# Patient Record
Sex: FEMALE | Race: WHITE | URBAN - METROPOLITAN AREA
[De-identification: names, ages, dates, MRNs, and addresses within clinical notes are randomized per-mention and may not be internally consistent; named-entity substitution may affect disease eponyms.]

---

## 2019-07-08 ENCOUNTER — NEW PATIENT (OUTPATIENT)
Dept: URBAN - METROPOLITAN AREA CLINIC 51 | Facility: CLINIC | Age: 74
End: 2019-07-08
Payer: MEDICARE

## 2019-07-08 DIAGNOSIS — H25.813 COMBINED FORMS OF AGE-RELATED CATARACT, BILATERAL: ICD-10-CM

## 2019-07-08 DIAGNOSIS — H04.123 TEAR FILM INSUFFICIENCY OF BILATERAL LACRIMAL GLANDS: ICD-10-CM

## 2019-07-08 DIAGNOSIS — H35.3122 NONEXUDATIVE MACULAR DEGENERATION, INTERMEDIATE DRY STAGE, LEFT EYE: Primary | ICD-10-CM

## 2019-07-08 PROCEDURE — 92134 CPTRZ OPH DX IMG PST SGM RTA: CPT | Performed by: OPTOMETRIST

## 2019-07-08 PROCEDURE — 92004 COMPRE OPH EXAM NEW PT 1/>: CPT | Performed by: OPTOMETRIST

## 2019-07-08 ASSESSMENT — INTRAOCULAR PRESSURE
OS: 18
OD: 19

## 2019-07-08 ASSESSMENT — KERATOMETRY
OS: 44.01
OD: 44.03

## 2019-07-08 ASSESSMENT — VISUAL ACUITY
OS: 20/20
OD: 20/20

## 2019-07-23 ENCOUNTER — FOLLOW UP ESTABLISHED (OUTPATIENT)
Dept: URBAN - METROPOLITAN AREA CLINIC 51 | Facility: CLINIC | Age: 74
End: 2019-07-23
Payer: MEDICARE

## 2019-07-23 PROCEDURE — 92004 COMPRE OPH EXAM NEW PT 1/>: CPT | Performed by: OPHTHALMOLOGY

## 2019-07-23 PROCEDURE — 92134 CPTRZ OPH DX IMG PST SGM RTA: CPT | Performed by: OPHTHALMOLOGY

## 2019-07-23 PROCEDURE — 92242 FLUORESCEIN&ICG ANGIOGRAPHY: CPT | Performed by: OPHTHALMOLOGY

## 2019-07-23 ASSESSMENT — INTRAOCULAR PRESSURE
OD: 12
OS: 13

## 2019-11-26 ENCOUNTER — FOLLOW UP ESTABLISHED (OUTPATIENT)
Dept: URBAN - METROPOLITAN AREA CLINIC 51 | Facility: CLINIC | Age: 74
End: 2019-11-26
Payer: MEDICARE

## 2019-11-26 DIAGNOSIS — H35.3112 NEXDTVE AGE-RELATED MCLR DEGN, RIGHT EYE, INTERMED DRY STAGE: ICD-10-CM

## 2019-11-26 DIAGNOSIS — H35.3222 EXDTVE AGE-REL MCLR DEGN, LEFT EYE, WITH INACT CHRDL NEOVAS: Primary | ICD-10-CM

## 2019-11-26 PROCEDURE — 92014 COMPRE OPH EXAM EST PT 1/>: CPT | Performed by: OPHTHALMOLOGY

## 2019-11-26 PROCEDURE — 92134 CPTRZ OPH DX IMG PST SGM RTA: CPT | Performed by: OPHTHALMOLOGY

## 2019-11-26 PROCEDURE — 92242 FLUORESCEIN&ICG ANGIOGRAPHY: CPT | Performed by: OPHTHALMOLOGY

## 2019-11-26 ASSESSMENT — INTRAOCULAR PRESSURE
OD: 13
OS: 15

## 2020-01-21 ENCOUNTER — FOLLOW UP ESTABLISHED (OUTPATIENT)
Dept: URBAN - METROPOLITAN AREA CLINIC 51 | Facility: CLINIC | Age: 75
End: 2020-01-21
Payer: MEDICARE

## 2020-01-21 PROCEDURE — 92014 COMPRE OPH EXAM EST PT 1/>: CPT | Performed by: OPHTHALMOLOGY

## 2020-01-21 PROCEDURE — 92242 FLUORESCEIN&ICG ANGIOGRAPHY: CPT | Performed by: OPHTHALMOLOGY

## 2020-01-21 PROCEDURE — 92134 CPTRZ OPH DX IMG PST SGM RTA: CPT | Performed by: OPHTHALMOLOGY

## 2020-01-21 ASSESSMENT — INTRAOCULAR PRESSURE
OS: 11
OD: 11

## 2020-06-09 ENCOUNTER — FOLLOW UP ESTABLISHED (OUTPATIENT)
Dept: URBAN - METROPOLITAN AREA CLINIC 51 | Facility: CLINIC | Age: 75
End: 2020-06-09
Payer: MEDICARE

## 2020-06-09 PROCEDURE — 92014 COMPRE OPH EXAM EST PT 1/>: CPT | Performed by: OPHTHALMOLOGY

## 2020-06-09 PROCEDURE — 92134 CPTRZ OPH DX IMG PST SGM RTA: CPT | Performed by: OPHTHALMOLOGY

## 2020-06-09 ASSESSMENT — INTRAOCULAR PRESSURE
OD: 12
OS: 12

## 2020-12-08 ENCOUNTER — FOLLOW UP ESTABLISHED (OUTPATIENT)
Dept: URBAN - METROPOLITAN AREA CLINIC 51 | Facility: CLINIC | Age: 75
End: 2020-12-08
Payer: MEDICARE

## 2020-12-08 DIAGNOSIS — H35.3232 EXUDATIVE AGE-RELATED MACULAR DEGENERATION, BILATERAL, WITH INACTIVE CHOROIDAL NEOVASCULARIZATION: Primary | ICD-10-CM

## 2020-12-08 PROCEDURE — 92014 COMPRE OPH EXAM EST PT 1/>: CPT | Performed by: OPHTHALMOLOGY

## 2020-12-08 PROCEDURE — 92134 CPTRZ OPH DX IMG PST SGM RTA: CPT | Performed by: OPHTHALMOLOGY

## 2020-12-08 ASSESSMENT — INTRAOCULAR PRESSURE
OS: 19
OD: 19

## 2021-06-22 ENCOUNTER — OFFICE VISIT (OUTPATIENT)
Dept: URBAN - METROPOLITAN AREA CLINIC 51 | Facility: CLINIC | Age: 76
End: 2021-06-22
Payer: MEDICARE

## 2021-06-22 PROCEDURE — 92134 CPTRZ OPH DX IMG PST SGM RTA: CPT | Performed by: OPHTHALMOLOGY

## 2021-06-22 PROCEDURE — 99213 OFFICE O/P EST LOW 20 MIN: CPT | Performed by: OPHTHALMOLOGY

## 2021-06-22 ASSESSMENT — INTRAOCULAR PRESSURE
OD: 15
OS: 16

## 2021-06-22 NOTE — IMPRESSION/PLAN
Impression: Exudative macular degeneration, with inactive choroidal neovascularization, bilateral Plan: OS>OD, stable with no progression, no CNV reactivation. monitor. 

RTC 6-12 months

## 2022-03-02 ENCOUNTER — OFFICE VISIT (OUTPATIENT)
Dept: URBAN - METROPOLITAN AREA CLINIC 51 | Facility: CLINIC | Age: 77
End: 2022-03-02
Payer: MEDICARE

## 2022-03-02 DIAGNOSIS — H52.4 PRESBYOPIA: ICD-10-CM

## 2022-03-02 DIAGNOSIS — H43.813 VITREOUS DEGENERATION, BILATERAL: ICD-10-CM

## 2022-03-02 DIAGNOSIS — H35.3111 NONEXUDATIVE MACULAR DEGENERATION, EARLY DRY STAGE, RIGHT EYE: ICD-10-CM

## 2022-03-02 PROCEDURE — 92134 CPTRZ OPH DX IMG PST SGM RTA: CPT | Performed by: OPHTHALMOLOGY

## 2022-03-02 PROCEDURE — 99214 OFFICE O/P EST MOD 30 MIN: CPT | Performed by: OPHTHALMOLOGY

## 2022-03-02 ASSESSMENT — INTRAOCULAR PRESSURE
OS: 13
OD: 15

## 2022-03-02 ASSESSMENT — KERATOMETRY
OD: 44.17
OS: 44.15

## 2022-03-02 ASSESSMENT — VISUAL ACUITY
OS: 20/30
OD: 20/30

## 2022-03-02 NOTE — IMPRESSION/PLAN
Impression: Combined forms of age-related cataract, bilateral Plan: Discussed  with patient. Discussed treatment options. Surgical treatment is recommended. Surgical risk and benefits were discussed. The patient elects to proceed with surgery. Recommend surgery .  Patient will need Retina clearance with Dr. Griselda Dove  on 03/15/22 OS first.

## 2022-03-02 NOTE — IMPRESSION/PLAN
Impression: Tear film insufficiency of bilateral lacrimal glands
--few inferior PEK OU
--debris in tear film OU Plan: Discussed daily artificial tears to improve with dry eye, and informed can purchase eye  drops over the counter , three to four times daily.

## 2022-03-02 NOTE — IMPRESSION/PLAN
Impression: Nonexudative macular degeneration, intermediate dry stage,left eye * takes Preservision
*quit smoking several years ago Plan: Patient was instructed to monitor vision closely. Patient educated on using AREDS II formula vitamins, and to contact the office if notes any changes in vision or 
any vision loss. Followed by retina, Test:
03/02/22 OCT MAC - OD: Good-ERM; OS: Good-PED stable

## 2022-03-02 NOTE — IMPRESSION/PLAN
Impression: Vitreous degeneration, bilateral Plan: Reviewed with patient, discussed signs and symptoms of RD (floater, flashing lights, curtain or veil) Patient will contact the office immediately  if notes any new  vision changes.

## 2022-03-31 ENCOUNTER — OFFICE VISIT (OUTPATIENT)
Dept: URBAN - METROPOLITAN AREA CLINIC 44 | Facility: CLINIC | Age: 77
End: 2022-03-31
Payer: MEDICARE

## 2022-03-31 PROCEDURE — 99214 OFFICE O/P EST MOD 30 MIN: CPT | Performed by: OPHTHALMOLOGY

## 2022-03-31 PROCEDURE — 92134 CPTRZ OPH DX IMG PST SGM RTA: CPT | Performed by: OPHTHALMOLOGY

## 2022-03-31 ASSESSMENT — INTRAOCULAR PRESSURE
OD: 16
OS: 16

## 2022-03-31 NOTE — IMPRESSION/PLAN
Impression: Combined forms of age-related cataract, bilateral Plan: visually significant, ok to proceed with cataract surgery

## 2022-05-26 ENCOUNTER — ADULT PHYSICAL (OUTPATIENT)
Dept: URBAN - METROPOLITAN AREA CLINIC 51 | Facility: CLINIC | Age: 77
End: 2022-05-26
Payer: MEDICARE

## 2022-05-26 DIAGNOSIS — H25.813 COMBINED FORMS OF AGE-RELATED CATARACT, BILATERAL: ICD-10-CM

## 2022-05-26 DIAGNOSIS — Z01.818 ENCOUNTER FOR OTHER PREPROCEDURAL EXAMINATION: Primary | ICD-10-CM

## 2022-05-26 PROCEDURE — 99203 OFFICE O/P NEW LOW 30 MIN: CPT | Performed by: PHYSICIAN ASSISTANT

## 2022-05-26 RX ORDER — CHOLECALCIFEROL (VITAMIN D3) 25 MCG
TABLET ORAL
Qty: 0 | Refills: 0 | Status: ACTIVE
Start: 2022-05-26

## 2022-06-02 ENCOUNTER — PRE-OPERATIVE VISIT (OUTPATIENT)
Dept: URBAN - METROPOLITAN AREA CLINIC 44 | Facility: CLINIC | Age: 77
End: 2022-06-02
Payer: MEDICARE

## 2022-06-02 DIAGNOSIS — H25.813 COMBINED FORMS OF AGE-RELATED CATARACT, BILATERAL: Primary | ICD-10-CM

## 2022-06-02 DIAGNOSIS — H04.123 TEAR FILM INSUFFICIENCY OF BILATERAL LACRIMAL GLANDS: ICD-10-CM

## 2022-06-02 DIAGNOSIS — H35.3232 EXUDATIVE AGE-RELATED MACULAR DEGENERATION, BILATERAL, WITH INACTIVE CHOROIDAL NEOVASCULARIZATION: ICD-10-CM

## 2022-06-02 DIAGNOSIS — H43.813 VITREOUS DEGENERATION, BILATERAL: ICD-10-CM

## 2022-06-02 PROCEDURE — 99214 OFFICE O/P EST MOD 30 MIN: CPT | Performed by: OPHTHALMOLOGY

## 2022-06-02 RX ORDER — DICLOFENAC SODIUM 1 MG/ML
0.1 % SOLUTION/ DROPS OPHTHALMIC
Qty: 5 | Refills: 2 | Status: ACTIVE
Start: 2022-06-02

## 2022-06-02 RX ORDER — PREDNISOLONE ACETATE 10 MG/ML
1 % SUSPENSION/ DROPS OPHTHALMIC
Qty: 5 | Refills: 2 | Status: ACTIVE
Start: 2022-06-02

## 2022-06-02 ASSESSMENT — VISUAL ACUITY
OS: 20/30
OD: 20/40

## 2022-06-02 ASSESSMENT — INTRAOCULAR PRESSURE
OS: 17
OD: 16

## 2022-06-02 NOTE — IMPRESSION/PLAN
Impression: Combined forms of age-related cataract, bilateral Plan: PLAN: (( AIM -0.25  OU: INJECTABLE OU PLUS GTTS (amd)  (DEXYCU 1st choice), NO ORA OU, NO LRI OU: Declined AMP, DENSE )) Discussed cataract diagnosis with the patient. Appropriate testing ordered for cataract diagnosis prior to Preop. Risks and benefits of surgical treatment were discussed and understood. Patient desires surgical treatment. Premium options discussed but patient declines and is ok with using glasses after surgery. Patient desires to proceed with surgery OU, OS FIRST. Both eyes examined, medically necessary due to impact in activities of daily living. Discussed with pt the need for glasses after surgery. Discussed there is a chance of developing capsular haze after surgery, which may be corrected with laser/yag after surgery. Discussed higher risks with smaller pupil and discussed iris stretch and higher risks of bleeding. Understands higher risk of complication and delayed healing due to dense cataract.

## 2022-06-02 NOTE — IMPRESSION/PLAN
Impression: Tear film insufficiency of bilateral lacrimal glands Plan: Use AT's. Discussed with patient, understands this may limit vision after surgery.

## 2022-06-02 NOTE — IMPRESSION/PLAN
Impression: Vitreous degeneration, bilateral Plan: Discussed signs and symptoms of retinal detachment (flashes,floaters, curtain) as precaution. Patient instructed to call or return to clinic if condition gets worse. Discussed with patient, understands this may limit vision after surgery. I will STOP taking the medications listed below when I get home from the hospital:  None Never

## 2022-06-10 ENCOUNTER — POST-OPERATIVE VISIT (OUTPATIENT)
Dept: URBAN - METROPOLITAN AREA CLINIC 51 | Facility: CLINIC | Age: 77
End: 2022-06-10
Payer: MEDICARE

## 2022-06-10 DIAGNOSIS — Z48.810 ENCOUNTER FOR SURGICAL AFTERCARE FOLLOWING SURGERY ON A SENSE ORGAN: Primary | ICD-10-CM

## 2022-06-10 PROCEDURE — 99024 POSTOP FOLLOW-UP VISIT: CPT | Performed by: OPTOMETRIST

## 2022-06-10 ASSESSMENT — INTRAOCULAR PRESSURE: OS: 20

## 2022-06-10 NOTE — IMPRESSION/PLAN
Impression: S/P Cataract Extraction by phacoemulsification with IOL placement OS - 1 Day. Encounter for surgical aftercare following surgery on a sense organ  Z48.810. Plan: Doing well POD1. Swelling and inflammation noted on today's examination. Discussed with patient that vision will continue to improve as swelling and inflammation resolves. Recommend ATs when eyes feel dry/gritty, itchy, or water. pred/diclo tid x 2 weeks. RTC as scheduled.

## 2022-06-17 ENCOUNTER — POST-OPERATIVE VISIT (OUTPATIENT)
Dept: URBAN - METROPOLITAN AREA CLINIC 51 | Facility: CLINIC | Age: 77
End: 2022-06-17
Payer: MEDICARE

## 2022-06-17 DIAGNOSIS — Z48.810 ENCOUNTER FOR SURGICAL AFTERCARE FOLLOWING SURGERY ON A SENSE ORGAN: Primary | ICD-10-CM

## 2022-06-17 PROCEDURE — 99024 POSTOP FOLLOW-UP VISIT: CPT | Performed by: OPTOMETRIST

## 2022-06-17 ASSESSMENT — VISUAL ACUITY
OS: 20/25
OD: 20/40

## 2022-06-17 ASSESSMENT — INTRAOCULAR PRESSURE
OS: 18
OD: 18

## 2022-06-17 NOTE — IMPRESSION/PLAN
Impression: S/P Cataract Extraction by phacoemulsification with IOL placement OS - 8 Days. Encounter for surgical aftercare following surgery on a sense organ  Z48.810. Plan: Doing well 1WK PO. Swelling and inflammation resolved. Clear and centered IOL OS. Discussed BCVA will continue to be limited following cataract surgery due to ARMD. Will likely still notice waviness/distortion in vision. AT's for comfort. Pred and Diclo TID x 1 more week. Okay to proceed with second eye, patient prefers. RTC as scheduled for cataract surgery OD.

## 2022-06-23 ENCOUNTER — SURGERY (OUTPATIENT)
Dept: URBAN - METROPOLITAN AREA SURGERY 19 | Facility: SURGERY | Age: 77
End: 2022-06-23
Payer: MEDICARE

## 2022-06-23 DIAGNOSIS — H25.811 COMBINED FORMS OF AGE-RELATED CATARACT, RIGHT EYE: Primary | ICD-10-CM

## 2022-06-24 ENCOUNTER — POST-OPERATIVE VISIT (OUTPATIENT)
Dept: URBAN - METROPOLITAN AREA CLINIC 51 | Facility: CLINIC | Age: 77
End: 2022-06-24
Payer: MEDICARE

## 2022-06-24 DIAGNOSIS — Z96.1 PRESENCE OF INTRAOCULAR LENS: Primary | ICD-10-CM

## 2022-06-24 PROCEDURE — 99024 POSTOP FOLLOW-UP VISIT: CPT | Performed by: OPTOMETRIST

## 2022-06-24 ASSESSMENT — INTRAOCULAR PRESSURE: OD: 22

## 2022-06-24 NOTE — IMPRESSION/PLAN
Impression: S/P Cataract Extraction by phacoemulsification with IOL placement OD - 1 Day. Presence of intraocular lens  Z96.1. Plan: Doing well POD1. Swelling and inflammation noted on today's examination. Discussed with patient that vision will continue to improve as swelling and inflammation resolves. Restrictions apply for 1 week. Continue Pred and Diclo TID x 2 weeks, then stop. Recommend ATs when eyes feel dry/gritty, itchy, or water. Can use OTC readers as needed.  RTC PRN - Release back to referring OD (Dr. Michelle Grayson)

## 2022-08-30 ENCOUNTER — OFFICE VISIT (OUTPATIENT)
Dept: URBAN - METROPOLITAN AREA CLINIC 51 | Facility: CLINIC | Age: 77
End: 2022-08-30
Payer: MEDICARE

## 2022-08-30 DIAGNOSIS — H43.813 VITREOUS DEGENERATION, BILATERAL: ICD-10-CM

## 2022-08-30 DIAGNOSIS — H35.3232 EXUDATIVE AGE-RELATED MACULAR DEGENERATION, BILATERAL, WITH INACTIVE CHOROIDAL NEOVASCULARIZATION: Primary | ICD-10-CM

## 2022-08-30 PROCEDURE — 92134 CPTRZ OPH DX IMG PST SGM RTA: CPT | Performed by: OPHTHALMOLOGY

## 2022-08-30 PROCEDURE — 99214 OFFICE O/P EST MOD 30 MIN: CPT | Performed by: OPHTHALMOLOGY

## 2022-08-30 ASSESSMENT — INTRAOCULAR PRESSURE
OS: 10
OD: 11

## 2022-08-30 NOTE — IMPRESSION/PLAN
Impression: Exudative macular degeneration, with inactive choroidal neovascularization, bilateral Plan: OS>OD, stable with no progression, no CNV reactivation. monitor. 

RTC 6 months

## 2023-02-28 ENCOUNTER — OFFICE VISIT (OUTPATIENT)
Dept: URBAN - METROPOLITAN AREA CLINIC 51 | Facility: CLINIC | Age: 78
End: 2023-02-28
Payer: MEDICARE

## 2023-02-28 DIAGNOSIS — H43.813 VITREOUS DEGENERATION, BILATERAL: ICD-10-CM

## 2023-02-28 DIAGNOSIS — H35.3232 EXUDATIVE AGE-RELATED MACULAR DEGENERATION, BILATERAL, WITH INACTIVE CHOROIDAL NEOVASCULARIZATION: Primary | ICD-10-CM

## 2023-02-28 PROCEDURE — 92134 CPTRZ OPH DX IMG PST SGM RTA: CPT | Performed by: OPHTHALMOLOGY

## 2023-02-28 PROCEDURE — 99214 OFFICE O/P EST MOD 30 MIN: CPT | Performed by: OPHTHALMOLOGY

## 2023-08-29 ENCOUNTER — OFFICE VISIT (OUTPATIENT)
Dept: URBAN - METROPOLITAN AREA CLINIC 51 | Facility: CLINIC | Age: 78
End: 2023-08-29
Payer: MEDICARE

## 2023-08-29 DIAGNOSIS — H26.492 OTHER SECONDARY CATARACT, LEFT EYE: ICD-10-CM

## 2023-08-29 DIAGNOSIS — H43.813 VITREOUS DEGENERATION, BILATERAL: ICD-10-CM

## 2023-08-29 DIAGNOSIS — H35.3232 EXUDATIVE AGE-RELATED MACULAR DEGENERATION, BILATERAL, WITH INACTIVE CHOROIDAL NEOVASCULARIZATION: Primary | ICD-10-CM

## 2023-08-29 PROCEDURE — 92134 CPTRZ OPH DX IMG PST SGM RTA: CPT | Performed by: OPHTHALMOLOGY

## 2023-08-29 PROCEDURE — 99214 OFFICE O/P EST MOD 30 MIN: CPT | Performed by: OPHTHALMOLOGY

## 2023-08-29 ASSESSMENT — INTRAOCULAR PRESSURE
OS: 8
OD: 8

## 2024-02-27 ENCOUNTER — OFFICE VISIT (OUTPATIENT)
Dept: URBAN - METROPOLITAN AREA CLINIC 51 | Facility: CLINIC | Age: 79
End: 2024-02-27
Payer: MEDICARE

## 2024-02-27 DIAGNOSIS — H35.3232 EXUDATIVE AGE-RELATED MACULAR DEGENERATION, BILATERAL, WITH INACTIVE CHOROIDAL NEOVASCULARIZATION: Primary | ICD-10-CM

## 2024-02-27 DIAGNOSIS — H26.492 OTHER SECONDARY CATARACT, LEFT EYE: ICD-10-CM

## 2024-02-27 PROCEDURE — 99214 OFFICE O/P EST MOD 30 MIN: CPT | Performed by: OPHTHALMOLOGY

## 2024-02-27 PROCEDURE — 92134 CPTRZ OPH DX IMG PST SGM RTA: CPT | Performed by: OPHTHALMOLOGY

## 2024-02-27 ASSESSMENT — INTRAOCULAR PRESSURE
OD: 10
OS: 5